# Patient Record
Sex: FEMALE | Race: WHITE | Employment: UNEMPLOYED
[De-identification: names, ages, dates, MRNs, and addresses within clinical notes are randomized per-mention and may not be internally consistent; named-entity substitution may affect disease eponyms.]

---

## 2023-10-04 ENCOUNTER — TELEPHONE (OUTPATIENT)
Facility: CLINIC | Age: 63
End: 2023-10-04

## 2023-10-04 NOTE — TELEPHONE ENCOUNTER
Called contact number and was told that this was the wrong one and did not know who the patient was, unable to reach     Called and left voicemail x2 10/05/2023 0922    Called and left voicemail x1 10/04/2023 1352    ----- Message from Agnes Sarabia sent at 10/4/2023 12:45 PM EDT -----  Subject: Message to Provider    QUESTIONS  Information for Provider? Patients sister called today to get patient   established. She is needing something close to her house as she does not   always have transportation and does not drive herself. First available is   not until March. Wondering if the office has a cancelation list and if she   can be added to it.  ---------------------------------------------------------------------------  --------------  Erica Mora  [phone]; OK to leave message on voicemail  ---------------------------------------------------------------------------  --------------  SCRIPT ANSWERS  Relationship to Patient? Other/Third Party  Representative Name? Ana Cristina Cervantes (NO HIPAA, new patient)  Is the representative on the Communication Release of Information (GLENROY)   form in Epic?  Yes

## 2025-04-08 ENCOUNTER — TELEPHONE (OUTPATIENT)
Age: 65
End: 2025-04-08

## 2025-04-08 NOTE — TELEPHONE ENCOUNTER
Reached out to the patient to schedule for memory loss but number on file are stating they are no longer working.